# Patient Record
(demographics unavailable — no encounter records)

---

## 2024-11-19 NOTE — HISTORY OF PRESENT ILLNESS
[FreeTextEntry1] : RPA: eczema, acne [de-identified] : Rosa Dawn 15 y/o F presents for F/U. With father. Was orignally scheduled to come w/ sister, who also had appt with me, but sister is in hospital for broken foot.  1) eczema on face better but still using HCN every few days otherwise she gets dry scaly patches around mouth and eyebrows. Also has persistent scaly red plaque on L forearm, has not gone away. Wears metal bracelet on that arm.  using: cerave face cleanser, vaseline intensive care lotion for face   2) acne on face is better. Did not start using epiduo forte because she only breakouts w/ periods and they go away quickly. not bothersome.  ___________ Rosa Graber is a 13 yo F presenting with her sister for rash and acne.    1) Rash x few weeks on arms, neck, face. Itchy and red with peeling.  Recently started using lots of new skin care products. Includes: Cerave SA face wash, tatcha rice face wash, snail mucin, cerave moisturizing lotion. Now using vaseline cream since rash started.     2) acne- mostly on cheeks.

## 2024-11-19 NOTE — HISTORY OF PRESENT ILLNESS
[FreeTextEntry1] : RPA: eczema, acne [de-identified] : Rosa Dawn 15 y/o F presents for F/U. With father. Was orignally scheduled to come w/ sister, who also had appt with me, but sister is in hospital for broken foot.  1) eczema on face better but still using HCN every few days otherwise she gets dry scaly patches around mouth and eyebrows. Also has persistent scaly red plaque on L forearm, has not gone away. Wears metal bracelet on that arm.  using: cerave face cleanser, vaseline intensive care lotion for face   2) acne on face is better. Did not start using epiduo forte because she only breakouts w/ periods and they go away quickly. not bothersome.  ___________ Rosa Garber is a 13 yo F presenting with her sister for rash and acne.    1) Rash x few weeks on arms, neck, face. Itchy and red with peeling.  Recently started using lots of new skin care products. Includes: Cerave SA face wash, tatcha rice face wash, snail mucin, cerave moisturizing lotion. Now using vaseline cream since rash started.     2) acne- mostly on cheeks.

## 2024-11-19 NOTE — HISTORY OF PRESENT ILLNESS
[FreeTextEntry1] : RPA: eczema, acne [de-identified] : Rosa Dawn 15 y/o F presents for F/U. With father. Was orignally scheduled to come w/ sister, who also had appt with me, but sister is in hospital for broken foot.  1) eczema on face better but still using HCN every few days otherwise she gets dry scaly patches around mouth and eyebrows. Also has persistent scaly red plaque on L forearm, has not gone away. Wears metal bracelet on that arm.  using: cerave face cleanser, vaseline intensive care lotion for face   2) acne on face is better. Did not start using epiduo forte because she only breakouts w/ periods and they go away quickly. not bothersome.  ___________ Rosa Garber is a 13 yo F presenting with her sister for rash and acne.    1) Rash x few weeks on arms, neck, face. Itchy and red with peeling.  Recently started using lots of new skin care products. Includes: Cerave SA face wash, tatcha rice face wash, snail mucin, cerave moisturizing lotion. Now using vaseline cream since rash started.     2) acne- mostly on cheeks.

## 2024-11-19 NOTE — ASSESSMENT
[FreeTextEntry1] : #Dermatitis  - improved but not at treatment goal Favor contact dermatitis 2/2 cosmeceuticals  continue keeping skincare to a minimum (1 cleanser, 1 face moisturizer, and zinc oxide SPF in AM) advised her to remove bracelet on L arm for at least 2 months. Discussed that for allergic contact dermatitis, condition can persist even with exposure to causative allergen only once every 2-3 weeks   -stop hydrocortisone 2.5% cream.  -stop vaseline intensive care on face. recommended switching to vanicream face moisturizer. OK to continue cerave cleanser to face.  -start opzelura cream bid to AA on face and arm.  -RTC 12 weeks  #Acne- controlled Instructed not to start until dermatitis on face and neck as completely resolved -stop epiduo forte -gentle face wash am and pm - follow up as needed  RTC 12 weeks or sooner prn

## 2024-11-19 NOTE — HISTORY OF PRESENT ILLNESS
[FreeTextEntry1] : RPA: eczema, acne [de-identified] : Rosa Dawn 15 y/o F presents for F/U. With father. Was orignally scheduled to come w/ sister, who also had appt with me, but sister is in hospital for broken foot.  1) eczema on face better but still using HCN every few days otherwise she gets dry scaly patches around mouth and eyebrows. Also has persistent scaly red plaque on L forearm, has not gone away. Wears metal bracelet on that arm.  using: cerave face cleanser, vaseline intensive care lotion for face   2) acne on face is better. Did not start using epiduo forte because she only breakouts w/ periods and they go away quickly. not bothersome.  ___________ Rosa Garber is a 13 yo F presenting with her sister for rash and acne.    1) Rash x few weeks on arms, neck, face. Itchy and red with peeling.  Recently started using lots of new skin care products. Includes: Cerave SA face wash, tatcha rice face wash, snail mucin, cerave moisturizing lotion. Now using vaseline cream since rash started.     2) acne- mostly on cheeks.

## 2024-11-19 NOTE — PHYSICAL EXAM
[FreeTextEntry3] : Left anterior wrist: round scaling plaque  Face and neck is resolved today.   cheeks: clear of acne today

## 2025-03-20 NOTE — HISTORY OF PRESENT ILLNESS
[FreeTextEntry1] : RP - eczema [de-identified] : Rosa Dawn 16y/o F presents for follow up eczema. Parent signed consent form to release medical decision making and allow her to come alone. Scanned in chart.  Eczema is improved. Opzelura helps but doesn't use often (2 days a week ) L arm much better, only flat dark patch now.  R arm still itching with raised plaque. Wearing metal bracelet on R wrist, unsure what material it is.  Does agree that she may be allergic to her jewelery. Says she switched out L wrist bracelet for a gold one and has not had recurrence there.   Face: eczema cleared. Using vanicream face wash and moisturizer, helping. ________________________________________________  LV: 11/19/24  presents for F/U. With father. Was orignally scheduled to come w/ sister, who also had appt with me, but sister is in hospital for broken foot.  1) eczema on face better but still using HCN every few days otherwise she gets dry scaly patches around mouth and eyebrows. Also has persistent scaly red plaque on L forearm, has not gone away. Wears metal bracelet on that arm.  using: cerave face cleanser, vaseline intensive care lotion for face   2) acne on face is better. Did not start using epiduo forte because she only breakouts w/ periods and they go away quickly. not bothersome.  ___________ Rosa Garber is a 15 yo F presenting with her sister for rash and acne.    1) Rash x few weeks on arms, neck, face. Itchy and red with peeling.  Recently started using lots of new skin care products. Includes: Cerave SA face wash, tatcha rice face wash, snail mucin, cerave moisturizing lotion. Now using vaseline cream since rash started.     2) acne- mostly on cheeks.

## 2025-03-20 NOTE — ASSESSMENT
[FreeTextEntry1] : #Dermatitis  - improved but not at treatment goal Favor contact dermatitis 2/2 cosmeceuticals and/or metal from jewelry Tried: hcn 2.5 cream Rash on L wrist resolved after removing prior bracelet.  advised her to remove bracelet on R arm.  Discussed that for allergic contact dermatitis, condition can persist even with exposure to causative allergen only once every 2-3 weeks. Discussed that nickel is one of most common allergens.  -continue gentle cleanser daily -continue opzelura cream bid to AA on face and arm. advised her to use it regularly until skin completely resolves.  -patch testing discussed and recommended, declines for now.   RTC 12 weeks

## 2025-03-20 NOTE — HISTORY OF PRESENT ILLNESS
[FreeTextEntry1] : RP - eczema [de-identified] : Rosa Dawn 16y/o F presents for follow up eczema. Parent signed consent form to release medical decision making and allow her to come alone. Scanned in chart.  Eczema is improved. Opzelura helps but doesn't use often (2 days a week ) L arm much better, only flat dark patch now.  R arm still itching with raised plaque. Wearing metal bracelet on R wrist, unsure what material it is.  Does agree that she may be allergic to her jewelery. Says she switched out L wrist bracelet for a gold one and has not had recurrence there.   Face: eczema cleared. Using vanicream face wash and moisturizer, helping. ________________________________________________  LV: 11/19/24  presents for F/U. With father. Was orignally scheduled to come w/ sister, who also had appt with me, but sister is in hospital for broken foot.  1) eczema on face better but still using HCN every few days otherwise she gets dry scaly patches around mouth and eyebrows. Also has persistent scaly red plaque on L forearm, has not gone away. Wears metal bracelet on that arm.  using: cerave face cleanser, vaseline intensive care lotion for face   2) acne on face is better. Did not start using epiduo forte because she only breakouts w/ periods and they go away quickly. not bothersome.  ___________ Rosa Garber is a 13 yo F presenting with her sister for rash and acne.    1) Rash x few weeks on arms, neck, face. Itchy and red with peeling.  Recently started using lots of new skin care products. Includes: Cerave SA face wash, tatcha rice face wash, snail mucin, cerave moisturizing lotion. Now using vaseline cream since rash started.     2) acne- mostly on cheeks.

## 2025-03-20 NOTE — PHYSICAL EXAM
[FreeTextEntry3] : Left anterior wrist:brown patch  R wrist: red scaling plaque   Face and neck is resolved today.   cheeks: clear of acne today